# Patient Record
Sex: FEMALE | Race: WHITE | Employment: OTHER | URBAN - METROPOLITAN AREA
[De-identification: names, ages, dates, MRNs, and addresses within clinical notes are randomized per-mention and may not be internally consistent; named-entity substitution may affect disease eponyms.]

---

## 2017-01-05 ENCOUNTER — APPOINTMENT (OUTPATIENT)
Dept: GENERAL RADIOLOGY | Facility: HOSPITAL | Age: 82
DRG: 229 | End: 2017-01-05
Attending: NURSE PRACTITIONER
Payer: MEDICARE

## 2017-01-05 ENCOUNTER — ANESTHESIA EVENT (OUTPATIENT)
Dept: CARDIAC SURGERY | Facility: HOSPITAL | Age: 82
DRG: 229 | End: 2017-01-05
Payer: MEDICARE

## 2017-01-05 PROBLEM — I34.0 MITRAL REGURGITATION: Status: ACTIVE | Noted: 2017-01-05

## 2017-01-05 PROBLEM — Z91.81 AT RISK FOR FALLING: Status: ACTIVE | Noted: 2017-01-05

## 2017-01-05 PROCEDURE — 71010 XR CHEST AP PORTABLE  (CPT=71010): CPT

## 2017-01-05 NOTE — ANESTHESIA PREPROCEDURE EVALUATION
PRE-OP EVALUATION    Patient Name: Greg Barksdale    Pre-op Diagnosis: mitral regurgitation    Procedure(s):  Mitraclip    Surgeon(s) and Role:     Alondra Lee MD - Primary    Pre-op vitals reviewed.   Temp: 97.9 °F (36.6 °C)  Pulse: 80  Resp: 18  B Flush 100 UNIT/ML lock flush          Outpatient Medications:    furosemide (LASIX) 20 MG Oral Tab Take 20 mg by mouth daily. Disp:  Rfl:    Loperamide HCl 2 MG Oral Cap Take 2 mg by mouth 2 (two) times daily.    Disp:  Rfl:    RaNITidine HCl 150 MG Oral IMPRESSION:  Normal coronaries.     Dictated By Ester Caal M.D.  E:     41/68/5513 10:54:49  t:      01/03/2017 11:57:11  [facility]   [id number]      ECHO  FINDINGS:   · Overall normal LV systolic function with estimated LVEF 60-65%   without wall CHOLECYSTECTOMY          Smoking status: Never Smoker     Smokeless tobacco: Not on file    Alcohol Use: No       Drug Use: No     Available pre-op labs reviewed.     Lab Results  Component Value Date   WBC 6.1 01/05/2017   RBC 2.80* 01/05/2017   HGB 8.6* 0

## 2017-01-06 ENCOUNTER — APPOINTMENT (OUTPATIENT)
Dept: GENERAL RADIOLOGY | Facility: HOSPITAL | Age: 82
DRG: 229 | End: 2017-01-06
Attending: NURSE PRACTITIONER
Payer: MEDICARE

## 2017-01-06 ENCOUNTER — SURGERY (OUTPATIENT)
Age: 82
End: 2017-01-06

## 2017-01-06 ENCOUNTER — ANESTHESIA (OUTPATIENT)
Dept: CARDIAC SURGERY | Facility: HOSPITAL | Age: 82
DRG: 229 | End: 2017-01-06
Payer: MEDICARE

## 2017-01-06 ENCOUNTER — CHARTING TRANS (OUTPATIENT)
Dept: OTHER | Age: 82
End: 2017-01-06

## 2017-01-06 PROCEDURE — 71010 XR CHEST AP PORTABLE  (CPT=71010): CPT

## 2017-01-06 NOTE — ANESTHESIA POSTPROCEDURE EVALUATION
[address] Patient Status:  Outpatient in a Bed   Age/Gender 80year old female MRN [id number]   [facility] 6NE-A Attending Meche Perez MD   Hosp Day # 1 PCP PHYSICIAN NONSTAFF       Anesthesia Post-op Note    Proc

## 2017-01-07 ENCOUNTER — APPOINTMENT (OUTPATIENT)
Dept: GENERAL RADIOLOGY | Facility: HOSPITAL | Age: 82
DRG: 229 | End: 2017-01-07
Attending: NURSE PRACTITIONER
Payer: MEDICARE

## 2017-01-07 ENCOUNTER — APPOINTMENT (OUTPATIENT)
Dept: CV DIAGNOSTICS | Facility: HOSPITAL | Age: 82
DRG: 229 | End: 2017-01-07
Attending: NURSE PRACTITIONER
Payer: MEDICARE

## 2017-01-07 PROCEDURE — 71010 XR CHEST AP PORTABLE  (CPT=71010): CPT

## 2017-01-07 PROCEDURE — 93306 TTE W/DOPPLER COMPLETE: CPT | Performed by: NURSE PRACTITIONER

## 2017-01-07 PROCEDURE — 93306 TTE W/DOPPLER COMPLETE: CPT

## 2017-01-17 ENCOUNTER — CHARTING TRANS (OUTPATIENT)
Dept: OTHER | Age: 82
End: 2017-01-17

## 2017-02-08 ENCOUNTER — HOSPITAL ENCOUNTER (OUTPATIENT)
Dept: CARDIOLOGY CLINIC | Facility: HOSPITAL | Age: 82
Discharge: HOME | End: 2017-02-08
Attending: NURSE PRACTITIONER
Payer: MEDICARE

## 2017-02-08 ENCOUNTER — HOSPITAL ENCOUNTER (OUTPATIENT)
Dept: CV DIAGNOSTICS | Facility: HOSPITAL | Age: 82
Discharge: HOME | End: 2017-02-08
Attending: THORACIC SURGERY (CARDIOTHORACIC VASCULAR SURGERY)
Payer: MEDICARE

## 2017-02-08 VITALS
SYSTOLIC BLOOD PRESSURE: 146 MMHG | OXYGEN SATURATION: 98 % | RESPIRATION RATE: 18 BRPM | DIASTOLIC BLOOD PRESSURE: 66 MMHG | HEART RATE: 94 BPM | TEMPERATURE: 98 F

## 2017-02-08 DIAGNOSIS — I35.0 AORTIC STENOSIS: ICD-10-CM

## 2017-02-08 PROCEDURE — 99214 OFFICE O/P EST MOD 30 MIN: CPT

## 2017-02-08 PROCEDURE — 94620 HC PULMONARY STRESS TEST SIMPLE (6 MIN WALK): CPT

## 2017-02-08 PROCEDURE — 93306 TTE W/DOPPLER COMPLETE: CPT | Performed by: INTERNAL MEDICINE

## 2017-02-08 PROCEDURE — 93306 TTE W/DOPPLER COMPLETE: CPT

## 2017-02-08 NOTE — PROGRESS NOTES
Mely Whiting Note    Subjective:   Patient presents for routine 1month postop TMVR visit, accompanied by her daughter. She underwent placement of  Single Mitraclip due to severe MR with Dr. Amina Manuel on 1/6/17.   Overall she is feeling well, Results  Component Value Date   INR 1.14* 01/07/2017   INR 1.27* 01/06/2017   INR 1.17* 01/05/2017       Medications:    Current Outpatient Prescriptions on File Prior to Encounter:  Clopidogrel Bisulfate 75 MG Oral Tab Take 1 tablet (75 mg total) by mouth

## 2017-02-09 ENCOUNTER — TELEPHONE (OUTPATIENT)
Dept: CARDIOLOGY CLINIC | Facility: HOSPITAL | Age: 82
End: 2017-02-09

## 2017-02-09 NOTE — PROGRESS NOTES
I called and updated Mrs. Bebe Machuca on her echo results from yesterday. I advised that the Mitraclip was well positioned, and there was mild-mod MR - unchanged from previous echo post procedure. Her EF was 55%, Mean MV gradient 8mm.   She verbalized underst

## 2017-04-12 ENCOUNTER — HOSPITAL ENCOUNTER (OUTPATIENT)
Dept: CARDIOLOGY CLINIC | Facility: HOSPITAL | Age: 82
Discharge: HOME | End: 2017-04-12
Attending: NURSE PRACTITIONER
Payer: MEDICARE

## 2017-04-12 ENCOUNTER — HOSPITAL ENCOUNTER (OUTPATIENT)
Dept: CV DIAGNOSTICS | Facility: HOSPITAL | Age: 82
Discharge: HOME | End: 2017-04-12
Attending: THORACIC SURGERY (CARDIOTHORACIC VASCULAR SURGERY)
Payer: MEDICARE

## 2017-04-12 VITALS
OXYGEN SATURATION: 95 % | TEMPERATURE: 98 F | SYSTOLIC BLOOD PRESSURE: 159 MMHG | DIASTOLIC BLOOD PRESSURE: 79 MMHG | HEART RATE: 84 BPM

## 2017-04-12 DIAGNOSIS — I35.0 AORTIC STENOSIS: ICD-10-CM

## 2017-04-12 PROCEDURE — 99213 OFFICE O/P EST LOW 20 MIN: CPT

## 2017-04-12 PROCEDURE — 93306 TTE W/DOPPLER COMPLETE: CPT

## 2017-04-12 PROCEDURE — 93306 TTE W/DOPPLER COMPLETE: CPT | Performed by: INTERNAL MEDICINE

## 2017-04-12 RX ORDER — CHLORTHALIDONE 25 MG/1
25 TABLET ORAL DAILY
COMMUNITY

## 2017-04-12 RX ORDER — NIFEDIPINE 30 MG/1
30 TABLET, FILM COATED, EXTENDED RELEASE ORAL DAILY
COMMUNITY

## 2017-04-12 NOTE — PROGRESS NOTES
Mely Whiting Note    Subjective:   Patient presents for routine 3 month postop TAVR visit, accompanied by her daughter. She underwent placement of single Mitraclip due to severe MR with Dr. Jamila Quarles on 1/6/17.   Overall she has been doing we total) by mouth daily. Disp: 30 tablet Rfl: 11   furosemide (LASIX) 20 MG Oral Tab Take 20 mg by mouth. Every other day  Disp:  Rfl:    Loperamide HCl 2 MG Oral Cap Take 2 mg by mouth 2 (two) times daily.    Disp:  Rfl:    RaNITidine HCl 150 MG Oral Tab [facility]

## 2017-04-13 ENCOUNTER — TELEPHONE (OUTPATIENT)
Dept: CARDIOLOGY CLINIC | Facility: HOSPITAL | Age: 82
End: 2017-04-13

## 2017-04-13 NOTE — PROGRESS NOTES
I called and updated patient on the results of her echo from 4/12/17. I advised that her EF was 65%, the Mitraclip was present - with mild-mod regurg, mean grad 5mm. I advised since her last echo 2/8/17 - it was unchanged.   She will f/u in 3 months as di

## 2017-07-12 ENCOUNTER — HOSPITAL ENCOUNTER (OUTPATIENT)
Dept: CARDIOLOGY CLINIC | Facility: HOSPITAL | Age: 82
Discharge: HOME | End: 2017-07-12
Attending: THORACIC SURGERY (CARDIOTHORACIC VASCULAR SURGERY)
Payer: MEDICARE

## 2017-07-12 ENCOUNTER — HOSPITAL ENCOUNTER (OUTPATIENT)
Dept: CV DIAGNOSTICS | Facility: HOSPITAL | Age: 82
Discharge: HOME | End: 2017-07-12
Attending: THORACIC SURGERY (CARDIOTHORACIC VASCULAR SURGERY)
Payer: MEDICARE

## 2017-07-12 VITALS
OXYGEN SATURATION: 100 % | SYSTOLIC BLOOD PRESSURE: 142 MMHG | RESPIRATION RATE: 18 BRPM | HEART RATE: 84 BPM | TEMPERATURE: 98 F | DIASTOLIC BLOOD PRESSURE: 75 MMHG

## 2017-07-12 DIAGNOSIS — I35.0 AORTIC STENOSIS: ICD-10-CM

## 2017-07-12 PROCEDURE — 93306 TTE W/DOPPLER COMPLETE: CPT | Performed by: THORACIC SURGERY (CARDIOTHORACIC VASCULAR SURGERY)

## 2017-07-12 PROCEDURE — 99214 OFFICE O/P EST MOD 30 MIN: CPT

## 2017-07-12 NOTE — PROGRESS NOTES
[facility]  Valve Clinic Note    Subjective:   Patient presents for 6-month postop TMVR visit using a walker and accompanied by daughter. She had a Mitraclip X 1 placement on 1/6/17. Continues to do well since our last visit.  Occasionally reports toshia moderate residual regurgitation directed eccentrically,     anteriorly, and toward the septum. Mean gradient (D): 5mm Hg. 5. Left atrium: The left atrial volume was markedly increased. 6. Right atrium: The atrium was moderately dilated.   7. Pulmonary art no Tx  8. Syncopal episode 2016  9. H/o breast CA, stage 2B - s/p L mastectomy 3/2016  10. Hyperglycemia      Plan:   - Echo today  - Stop Plavix.  Start ASA 81 mg daily  - Advised patient that if she notices increased BLE edema and/or sob, fatigue, to call

## 2017-07-13 ENCOUNTER — TELEPHONE (OUTPATIENT)
Dept: CARDIOLOGY CLINIC | Facility: HOSPITAL | Age: 82
End: 2017-07-13

## 2017-07-13 NOTE — PROGRESS NOTES
Called patient with results of echo yesterday. Her EF remains at 65%, clip present with mild regurgitation. No significant interval changes. She has an appointment in the Valve Clinic in 6 months with echo following.  She verbalized understanding of informa

## 2018-01-03 ENCOUNTER — HOSPITAL ENCOUNTER (OUTPATIENT)
Dept: CARDIOLOGY CLINIC | Facility: HOSPITAL | Age: 83
End: 2018-01-03
Attending: NURSE PRACTITIONER
Payer: MEDICARE

## 2018-02-14 ENCOUNTER — HOSPITAL ENCOUNTER (OUTPATIENT)
Dept: CARDIOLOGY CLINIC | Facility: HOSPITAL | Age: 83
Discharge: HOME | End: 2018-02-14
Attending: NURSE PRACTITIONER
Payer: MEDICARE

## 2018-02-14 ENCOUNTER — HOSPITAL ENCOUNTER (OUTPATIENT)
Dept: CV DIAGNOSTICS | Facility: HOSPITAL | Age: 83
Discharge: HOME | End: 2018-02-14
Attending: INTERNAL MEDICINE
Payer: MEDICARE

## 2018-02-14 VITALS
HEART RATE: 88 BPM | RESPIRATION RATE: 16 BRPM | DIASTOLIC BLOOD PRESSURE: 70 MMHG | TEMPERATURE: 98 F | SYSTOLIC BLOOD PRESSURE: 142 MMHG | OXYGEN SATURATION: 98 %

## 2018-02-14 DIAGNOSIS — I34.0 MITRAL REGURGITATION: ICD-10-CM

## 2018-02-14 PROCEDURE — 99214 OFFICE O/P EST MOD 30 MIN: CPT

## 2018-02-14 PROCEDURE — 93306 TTE W/DOPPLER COMPLETE: CPT | Performed by: INTERNAL MEDICINE

## 2018-02-14 NOTE — PROGRESS NOTES
Mely Whiting Note    Subjective:   Patient presents for 1 year postop TMVR visit using a walker and accompanied by daughter. She had a Mitraclip X 1 placement on 1/6/17.  Continues to do well since our last visit, and is planning a week tri Hg.      Labs:           Lab Results  Component Value Date   INR 1.14 (H) 01/07/2017   INR 1.27 (H) 01/06/2017   INR 1.17 (H) 01/05/2017       Medications:    Current Outpatient Prescriptions on File Prior to Encounter:  aspirin 81 MG Oral Tab Take 81 mg b coordination, and counseling related to their care.     WARREN Villa  2/14/2018   2:50 PM

## 2018-02-19 ENCOUNTER — TELEPHONE (OUTPATIENT)
Dept: CARDIOLOGY CLINIC | Facility: HOSPITAL | Age: 83
End: 2018-02-19

## 2018-02-19 NOTE — PROGRESS NOTES
I called patient/dtr  with results of echo from 2/14/18. Patient's EF remains at 70%, clip present with 2+ regurgitation. Overall, there has been no significant interval changes compared to previous echo on 7/12/17.  Patient has completed 1 year post Mitrac

## 2018-12-02 VITALS
WEIGHT: 130 LBS | SYSTOLIC BLOOD PRESSURE: 146 MMHG | DIASTOLIC BLOOD PRESSURE: 80 MMHG | BODY MASS INDEX: 22.2 KG/M2 | RESPIRATION RATE: 16 BRPM | HEART RATE: 84 BPM | HEIGHT: 64 IN

## (undated) NOTE — IP AVS SNAPSHOT
[facility] [place] [address] [place], [address] ~ [phone]                After Visit Summary   2/8/2017    Shayna Mcgregor    MRN: [id number]           Visit Information        Provider Department    2/8/2017  1:00 PM [facility] Sign up for 3VRt, your secure online medical record. [employer] will allow you to access patient instructions from your recent visit,  view other health information, and more. To sign up or find more information, go to [email or web address]

## (undated) NOTE — LETTER
[address]  [address] [place] [address] Drive  [phone]           2/8/17          To Whom It May Concern,              Yaritza Montenegro ([birth year]) was present at [facility] for [address] at [facility] in [place]

## (undated) NOTE — IP AVS SNAPSHOT
[facility] [place] [address] [place], [address] ~ [phone]                After Visit Summary   4/12/2017    Cain Browne    MRN: [id number]           Visit Information        Provider Department    4/12/2017 12:00 PM Shiloh Culver [facility] Echocardiography [facility])    [place]  [address]  [place] [address]   [phone]            Jul 12, 2017 12:00 PM   TMVR with HEART FAILURE N [address] [facility] for [address] ([address]